# Patient Record
(demographics unavailable — no encounter records)

---

## 2025-03-31 NOTE — ASSESSMENT
[FreeTextEntry1] : I personally reviewed, interpreted and discussed patient's MRI images. increased size of LN.  I discussed removal given the FNA results being in the gray area.  Patient will see Dr Wills for removal.

## 2025-03-31 NOTE — DATA REVIEWED
[de-identified] : MRI neck w/wo contrast Interval mild increase in size of peripherally enhancing, centrally fat intensity lesion within the right level 5B station with associated soft tissue nodularity and enhancing internal septations. 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 Recommend surgical consultation. Additional enhancing 5 mm nodule is stable as compared to the prior examination and may represent a satellite lesion versus tiny lyjmph node.

## 2025-03-31 NOTE — PHYSICAL EXAM
[Normal] : mucosa is normal [Midline] : trachea located in midline position [de-identified] : nodule of right Deep cervical area on right side.

## 2025-03-31 NOTE — HISTORY OF PRESENT ILLNESS
[FreeTextEntry1] : 53 year old patient is here for follow up on MRI results , states been okay since last visit. Continues to have neck swelling with some pain. Denies any drainage. Feels lump getting a little bigger.  Has not been taking any omeprazole.

## 2025-04-17 NOTE — DATA REVIEWED
[de-identified] : CT and MR neck reviewed demonstrating ~1.5cm solid right level 5A neck mass without invasive features

## 2025-04-17 NOTE — ASSESSMENT
[FreeTextEntry1] : Plan for excision of neck mass in office under local anesthesia Risks of neck surgery were discussed including unsightly scarring, bleeding, neurovascular injury including CN11 and CN12,  infection, general anesthesia, and need for reoperation

## 2025-04-17 NOTE — PHYSICAL EXAM
[de-identified] : right level 5A neck mass 1.5cm, mobile, firm [Normal] : mucosa is normal [Midline] : trachea located in midline position

## 2025-04-17 NOTE — HISTORY OF PRESENT ILLNESS
[FreeTextEntry1] : patient returns today for a follow up on subsequent evaluation for neck swelling, noise in ear, dizziness, off balance. patient reports that he been feeling good since last visit. patient states that he has a bump on the right side of neck that bothers him and painful when being touch he wants to see if it can be removed

## 2025-04-17 NOTE — REASON FOR VISIT
[Subsequent Evaluation] : a subsequent evaluation for [FreeTextEntry2] : neck swelling, noise in ear, dizziness, off balance.

## 2025-04-30 NOTE — PROCEDURE
[FreeTextEntry1] : Excision of subfascial mass of right neck (2.5cm) [FreeTextEntry2] : Right level 5 neck mass [FreeTextEntry3] : Written consent obtained. Anesthesia obtained with 1% lidocaine with 1:100,000 epinephrine. Dissection carried through skin, subq, and muscular fascia. Lipomatous appearing mass with solid component peripherally dissected using combination of scissor and bipolar cautery. Excised and sent for pathology fresh. Hemostasis obtained with bipolar cautery. Wound closed in multilayer fashion using 4-0 nylon for skin

## 2025-05-09 NOTE — ASSESSMENT
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

## 2025-05-09 NOTE — PHYSICAL EXAM
[Normal] : temporomandibular joint is normal [de-identified] : sutures removed healing appropriately [de-identified] : normal motor and sensory function

## 2025-05-09 NOTE — HISTORY OF PRESENT ILLNESS
[FreeTextEntry1] : patient returns today for a follow up on  subsequent evaluation for neck swelling, noise in ear, dizziness, off balance. patient states that he been feeling okay since last visit, PT states that he is not having any pain at this time. states that this is a follow up to take the stitches out